# Patient Record
Sex: MALE | Race: BLACK OR AFRICAN AMERICAN | Employment: FULL TIME | URBAN - METROPOLITAN AREA
[De-identification: names, ages, dates, MRNs, and addresses within clinical notes are randomized per-mention and may not be internally consistent; named-entity substitution may affect disease eponyms.]

---

## 2017-09-08 ENCOUNTER — APPOINTMENT (OUTPATIENT)
Dept: GENERAL RADIOLOGY | Facility: HOSPITAL | Age: 37
End: 2017-09-08
Attending: EMERGENCY MEDICINE
Payer: COMMERCIAL

## 2017-09-08 ENCOUNTER — HOSPITAL ENCOUNTER (EMERGENCY)
Facility: HOSPITAL | Age: 37
Discharge: HOME | End: 2017-09-08
Attending: EMERGENCY MEDICINE
Payer: COMMERCIAL

## 2017-09-08 VITALS
BODY MASS INDEX: 30.66 KG/M2 | OXYGEN SATURATION: 99 % | DIASTOLIC BLOOD PRESSURE: 92 MMHG | WEIGHT: 207 LBS | HEART RATE: 85 BPM | HEIGHT: 69 IN | TEMPERATURE: 98 F | SYSTOLIC BLOOD PRESSURE: 135 MMHG | RESPIRATION RATE: 18 BRPM

## 2017-09-08 DIAGNOSIS — S29.019A STRAIN OF THORACIC REGION, INITIAL ENCOUNTER: Primary | ICD-10-CM

## 2017-09-08 LAB
ANION GAP SERPL CALC-SCNC: 9 MMOL/L (ref 0–18)
BUN SERPL-MCNC: 11 MG/DL (ref 8–20)
BUN/CREAT SERPL: 9.3 (ref 10–20)
CALCIUM SERPL-MCNC: 9.5 MG/DL (ref 8.5–10.5)
CHLORIDE SERPL-SCNC: 105 MMOL/L (ref 95–110)
CO2 SERPL-SCNC: 27 MMOL/L (ref 22–32)
CREAT SERPL-MCNC: 1.18 MG/DL (ref 0.5–1.5)
D DIMER PPP FEU-MCNC: 0.27 MCG/ML (ref ?–0.5)
GLUCOSE SERPL-MCNC: 71 MG/DL (ref 70–99)
OSMOLALITY UR CALC.SUM OF ELEC: 290 MOSM/KG (ref 275–295)
POTASSIUM SERPL-SCNC: 4.3 MMOL/L (ref 3.3–5.1)
SODIUM SERPL-SCNC: 141 MMOL/L (ref 136–144)

## 2017-09-08 PROCEDURE — 99283 EMERGENCY DEPT VISIT LOW MDM: CPT

## 2017-09-08 PROCEDURE — 80048 BASIC METABOLIC PNL TOTAL CA: CPT | Performed by: EMERGENCY MEDICINE

## 2017-09-08 PROCEDURE — 36415 COLL VENOUS BLD VENIPUNCTURE: CPT

## 2017-09-08 PROCEDURE — 71010 XR CHEST AP PORTABLE  (CPT=71010): CPT | Performed by: EMERGENCY MEDICINE

## 2017-09-08 PROCEDURE — 85379 FIBRIN DEGRADATION QUANT: CPT | Performed by: EMERGENCY MEDICINE

## 2017-09-08 RX ORDER — NAPROXEN 500 MG/1
500 TABLET ORAL 2 TIMES DAILY PRN
Qty: 20 TABLET | Refills: 0 | Status: SHIPPED | OUTPATIENT
Start: 2017-09-08 | End: 2017-09-15

## 2017-09-08 RX ORDER — ACETAMINOPHEN 500 MG
1000 TABLET ORAL ONCE
Status: COMPLETED | OUTPATIENT
Start: 2017-09-08 | End: 2017-09-08

## 2017-09-08 RX ORDER — IBUPROFEN 600 MG/1
600 TABLET ORAL ONCE
Status: COMPLETED | OUTPATIENT
Start: 2017-09-08 | End: 2017-09-08

## 2017-09-08 RX ORDER — CYCLOBENZAPRINE HCL 10 MG
10 TABLET ORAL 3 TIMES DAILY PRN
Qty: 20 TABLET | Refills: 0 | Status: SHIPPED | OUTPATIENT
Start: 2017-09-08 | End: 2017-09-15

## 2017-09-09 NOTE — ED PROVIDER NOTES
Patient Seen in: [facility] AND [facility] Emergency Department    History   Patient presents with:  Back Pain (musculoskeletal)    Stated Complaint: upper back pain    HPI    Patient complains of mid upper back pain. Bilateral.  No trauma.   Did go on long car r room air    GENERAL: awake alert no distress  HEAD: normocephalic, atraumatic,   EYES: PERRLA, EOMI, conj sclera clear  THROAT: mmm, no lesions  NECK: supple, no meningeal signs  LUNGS: no resp distress, cta bilateral-tender across mid lower thoracic peris

## 2018-04-28 ENCOUNTER — HOSPITAL ENCOUNTER (EMERGENCY)
Facility: HOSPITAL | Age: 38
Discharge: HOME | End: 2018-04-28
Attending: EMERGENCY MEDICINE
Payer: COMMERCIAL

## 2018-04-28 VITALS
BODY MASS INDEX: 30.31 KG/M2 | DIASTOLIC BLOOD PRESSURE: 79 MMHG | SYSTOLIC BLOOD PRESSURE: 119 MMHG | HEIGHT: 68 IN | OXYGEN SATURATION: 98 % | TEMPERATURE: 98 F | HEART RATE: 88 BPM | WEIGHT: 200 LBS | RESPIRATION RATE: 17 BRPM

## 2018-04-28 DIAGNOSIS — S23.9XXA THORACIC BACK SPRAIN, INITIAL ENCOUNTER: Primary | ICD-10-CM

## 2018-04-28 PROCEDURE — 99283 EMERGENCY DEPT VISIT LOW MDM: CPT

## 2018-04-28 RX ORDER — CYCLOBENZAPRINE HCL 10 MG
10 TABLET ORAL 3 TIMES DAILY PRN
Qty: 20 TABLET | Refills: 0 | Status: SHIPPED | OUTPATIENT
Start: 2018-04-28 | End: 2018-05-05

## 2018-04-28 RX ORDER — NAPROXEN 500 MG/1
500 TABLET ORAL 2 TIMES DAILY PRN
Qty: 20 TABLET | Refills: 0 | Status: SHIPPED | OUTPATIENT
Start: 2018-04-28 | End: 2018-05-05

## 2018-04-28 NOTE — ED INITIAL ASSESSMENT (HPI)
c/o back spasms starting this morning, was here in September for the same thing, never followed up with a doctor regarding issues.  Denies incontinence or CP

## 2018-04-28 NOTE — ED PROVIDER NOTES
Patient Seen in: [facility] AND [facility] Emergency Department    History   Patient presents with:  Back Pain (musculoskeletal)    Stated Complaint:     HPI    The patient is a 70-year-old male who presents with left-sided mid back pain upon waking this morning Thoracic back: He exhibits tenderness, pain and spasm. He exhibits normal range of motion, no bony tenderness, no swelling and normal pulse.         Back:    No vertebral tenderness, no calf tenderness or swelling     Differential diagnosis includes mu

## 2018-04-28 NOTE — ED NOTES
Pt presents to ED with a c/o \"stabbing\" pain to right mid back. States he woke up this morning and \"turned a funny way\", pain onset shortly after. States pain radiated to upper back and neck originally, now localized to right mid back.  Denies any  co

## 2018-10-15 ENCOUNTER — HOSPITAL ENCOUNTER (EMERGENCY)
Facility: HOSPITAL | Age: 38
Discharge: HOME | End: 2018-10-15
Attending: EMERGENCY MEDICINE
Payer: COMMERCIAL

## 2018-10-15 VITALS
HEART RATE: 75 BPM | SYSTOLIC BLOOD PRESSURE: 132 MMHG | RESPIRATION RATE: 16 BRPM | BODY MASS INDEX: 29.62 KG/M2 | WEIGHT: 200 LBS | OXYGEN SATURATION: 96 % | DIASTOLIC BLOOD PRESSURE: 82 MMHG | HEIGHT: 69 IN | TEMPERATURE: 98 F

## 2018-10-15 DIAGNOSIS — F32.A DEPRESSION, UNSPECIFIED DEPRESSION TYPE: Primary | ICD-10-CM

## 2018-10-15 PROCEDURE — 99284 EMERGENCY DEPT VISIT MOD MDM: CPT

## 2018-10-15 PROCEDURE — 85025 COMPLETE CBC W/AUTO DIFF WBC: CPT

## 2018-10-15 PROCEDURE — 80307 DRUG TEST PRSMV CHEM ANLYZR: CPT | Performed by: EMERGENCY MEDICINE

## 2018-10-15 PROCEDURE — 80320 DRUG SCREEN QUANTALCOHOLS: CPT

## 2018-10-15 PROCEDURE — 85025 COMPLETE CBC W/AUTO DIFF WBC: CPT | Performed by: EMERGENCY MEDICINE

## 2018-10-15 PROCEDURE — 80307 DRUG TEST PRSMV CHEM ANLYZR: CPT

## 2018-10-15 PROCEDURE — 80048 BASIC METABOLIC PNL TOTAL CA: CPT | Performed by: EMERGENCY MEDICINE

## 2018-10-15 PROCEDURE — 80320 DRUG SCREEN QUANTALCOHOLS: CPT | Performed by: EMERGENCY MEDICINE

## 2018-10-15 PROCEDURE — 36415 COLL VENOUS BLD VENIPUNCTURE: CPT

## 2018-10-15 PROCEDURE — 99285 EMERGENCY DEPT VISIT HI MDM: CPT

## 2018-10-15 PROCEDURE — 80048 BASIC METABOLIC PNL TOTAL CA: CPT

## 2018-10-15 PROCEDURE — 81003 URINALYSIS AUTO W/O SCOPE: CPT | Performed by: EMERGENCY MEDICINE

## 2018-10-15 NOTE — ED NOTES
Dinner tray at bedside. Pt continues to be calm and cooperative, in direct view of this RN. Watching tv, denies needs at this time.

## 2018-10-15 NOTE — ED INITIAL ASSESSMENT (HPI)
Pt presents to ED for psych eval states \"I just want the pain to go away\". States feeling depressed. Denies SI attempt.

## 2018-10-15 NOTE — ED PROVIDER NOTES
Patient Seen in: [facility] Emergency Department    History   Patient presents with:  Eval-P (psychiatric)    Stated Complaint: pts states he needs to see someone about possibly having a \"mental breakdown\"    HPI    45-year-old male without sign SpO2 97%   BMI 29.53 kg/m²         Physical Exam      General Appearance: alert, no distress  Eyes: pupils equal and round no pallor or injection  ENT, Mouth: mucous membranes moist  Respiratory: there are no retractions, lungs are clear to auscultation diagnosis)    Disposition:  There is no disposition on file for this visit. There is no disposition time on file for this visit. Follow-up:  No follow-up provider specified.       Medications Prescribed:  There are no discharge medications for this kolton

## 2018-10-15 NOTE — ED NOTES
Care assumed from triage. Pt presents with c/o depression, reporting inc in life stressors such as job stress and a recent \"rough\" breakup with a girlfriend.  Pt states \"I just can't handle myself\" and reports that he is unstable emotionally, tearful, c

## 2018-10-16 NOTE — BH LEVEL OF CARE ASSESSMENT
Level of Care Assessment Note    General Questions  Why are you here?: \"Last Thursday, I hit a wall. I have been in a limbo state and  I have no desire to do anything. I have been missing work. I get there and then I end  up clocking out.   I am respo days): No  6. Have you ever done anything, started to do anything, or prepared to do anything to end your life? (lifetime): No  Score -  OV: 0- Low Risk   Describe : Gemma denies any sicidal behvior and denies any safety concens.   Both Marco Antonio [employer] worth livivng. Isolating. Lying in bed. Unable to work due to depression being incapacitating in his functiong. Anhedonia.   Crying spelss  Anxiety Symptoms: Generalized  Panic Attacks: denied  Trauma Reaction: (denied)  Bipolar Symptoms: No problems re you/others concerned about any of the following behaviors over the past 30 days?: Denies                                              Functional Impairment  Currently Attending School: No  Employment Status: Employed  Job Issues: Decreased Concentration;Mi Consciousness: Alert  Behavior  Exhibited behavior: Appropriate to situation;Participated; Other (comment)(asking for help)    Assessment Summary  Assessment Summary: Sourav Elkins is a 41year old male.   He presents to the ER with symptoms of worsening depression Psychiatric Diagnoses  Substance Related and Addictive Disorders: Cannabis-Related Disorder - Cannabis Use Disorder  Secondary Cannabis Use Disorder:  Moderate

## 2018-10-16 NOTE — ED NOTES
Belongings returned by [employer]. Pt cleared for d/c home by ED MD and crisis clinician. Discharged home with plan to follow up with PCP and ELLY as indicated. Alert and interactive. Hemodynamically stable.  Agrees with proposed care plan, all questions

## 2018-10-16 NOTE — ED PROVIDER NOTES
Signout taken with dispo pending crisis evaluation, Andra graciously evaluating and deeming appropriate for outpatient PHP with same tentatively scheduled for tomorrow - will DC home with same.

## 2018-10-17 ENCOUNTER — TELEPHONE (OUTPATIENT)
Dept: INTERNAL MEDICINE CLINIC | Facility: CLINIC | Age: 38
End: 2018-10-17

## 2018-10-17 ENCOUNTER — APPOINTMENT (OUTPATIENT)
Dept: LAB | Facility: HOSPITAL | Age: 38
End: 2018-10-17
Attending: INTERNAL MEDICINE
Payer: COMMERCIAL

## 2018-10-17 DIAGNOSIS — F33.2 SEVERE EPISODE OF RECURRENT MAJOR DEPRESSIVE DISORDER, WITHOUT PSYCHOTIC FEATURES (HCC): ICD-10-CM

## 2018-10-17 PROCEDURE — 84443 ASSAY THYROID STIM HORMONE: CPT

## 2018-10-17 PROCEDURE — 36415 COLL VENOUS BLD VENIPUNCTURE: CPT

## 2018-10-17 NOTE — PROGRESS NOTES
Sukumar Irby is a 41year old male. Patient presents with:  Depression      HPI:     HPI  Patient is a 42 yo M with no significant past medical history here with complaints of feeling depressed for last 3 months.   He has no history of diagnosed depression Smokeless tobacco: Never Used    Alcohol use: Yes      Comment: 250 mL of liquor/month, last drink - 3 weeks ago (as of 06/10/15)    Drug use: Yes      Types: Cannabis       REVIEW OF SYSTEMS:   Review of Systems   Constitutional: Positive for malaise/f delayed, not tangential and not slurred. He is withdrawn. He is not agitated, not aggressive, not hyperactive, not actively hallucinating and not combative. Thought content is not paranoid and not delusional. Cognition and memory are not impaired.  He does

## 2018-10-17 NOTE — ASSESSMENT & PLAN NOTE
Chronic major depression. Starting Lexapro 5 mg. Side effects like dizziness, headaches, dry mouth, vague GI discomfort and black box warning of worsening depression and suicidal thoughts  Discussed with the patient. Reassess in 3 weeks.   Increase the d

## 2018-10-17 NOTE — TELEPHONE ENCOUNTER
PATIENT DROPPED OFF FMLA FORMS. HE PAID THE $25 FEE AT TIME OF DROP OFF. PLEASE CALL PATIENT WHEN FORMS ARE COMPLETED. Claus Mccullough  PATIENT [facility] [phone]

## 2018-10-18 NOTE — TELEPHONE ENCOUNTER
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 Logged for processing.  NK

## 2018-10-23 NOTE — TELEPHONE ENCOUNTER
Dr. Ewa Hood    Please sign off on form:  -Highlight the patient and hit \"Chart\" button. -In Chart Review, w/in the Encounter tab - click 1 time on the Telephone call encounter for 10/17/18.  Scroll down the telephone encounter.  -Click \"scan on\" blue Hyp

## 2018-10-24 NOTE — TELEPHONE ENCOUNTER
[facility] and to [address] [place] [address] noted on HIPAA. Mailed copy to pt. Notified pt.

## 2018-11-12 NOTE — PROGRESS NOTES
Shabana Zee is a 41year old male. Patient presents with:  Depression      HPI:     MARLON  VANITA is here for 4-week follow-up of depression after starting Lexapro 5 mg.   As mentioned in my previous notes he was having significant symptoms of depression swelling. Gastrointestinal: Negative for abdominal pain, blood in stool, constipation, diarrhea and heartburn. Genitourinary: Negative for dysuria and frequency. Musculoskeletal: Negative for back pain, joint pain and neck pain.    Skin: Negative for Addressed This Visit        Mental Health    Severe episode of recurrent major depressive disorder, without psychotic features ([facility] [address] - Primary     Symptoms improving on Lexapro 5 mg. Will increase to 10 mg for better results. Tolerating it well.   Noted m

## 2018-11-12 NOTE — ASSESSMENT & PLAN NOTE
Symptoms improving on Lexapro 5 mg. Will increase to 10 mg for better results. Tolerating it well. Noted mild side effects-loss of appetite and dry mouth. Overall tolerating well. Reassess in 3 months. Continue counseling therapy on weekly basis.   It

## 2018-11-14 ENCOUNTER — TELEPHONE (OUTPATIENT)
Dept: ADMINISTRATIVE | Age: 38
End: 2018-11-14

## 2018-11-15 NOTE — TELEPHONE ENCOUNTER
[employer] Provider Statement form for Dr. Chen Grate received in [facility]. Logged for processing.  NK

## 2018-11-19 NOTE — TELEPHONE ENCOUNTER
Dr. Daniela Diaz,    Psychiatric eval form pending in Forms Dept. Do you know when the pt can return back to work or is it pending his therapist? Please advise.     Thank you,  [facility]

## 2018-11-19 NOTE — TELEPHONE ENCOUNTER
Spoke to pt and he is seeing a therapist. Arnulfo Floress to disregard forms. Will fax back to his nurse  to follow up with therapist. Anant Parekh last ov notes. Mailed copies to pt.

## 2019-02-14 ENCOUNTER — LAB ENCOUNTER (OUTPATIENT)
Dept: LAB | Facility: HOSPITAL | Age: 39
End: 2019-02-14
Attending: INTERNAL MEDICINE
Payer: COMMERCIAL

## 2019-02-14 ENCOUNTER — OFFICE VISIT (OUTPATIENT)
Dept: INTERNAL MEDICINE CLINIC | Facility: CLINIC | Age: 39
End: 2019-02-14
Payer: COMMERCIAL

## 2019-02-14 VITALS
SYSTOLIC BLOOD PRESSURE: 135 MMHG | TEMPERATURE: 99 F | HEIGHT: 69.5 IN | WEIGHT: 207.69 LBS | HEART RATE: 77 BPM | BODY MASS INDEX: 30.07 KG/M2 | DIASTOLIC BLOOD PRESSURE: 83 MMHG

## 2019-02-14 DIAGNOSIS — F33.2 SEVERE EPISODE OF RECURRENT MAJOR DEPRESSIVE DISORDER, WITHOUT PSYCHOTIC FEATURES (HCC): ICD-10-CM

## 2019-02-14 DIAGNOSIS — Z11.3 SCREENING FOR STD (SEXUALLY TRANSMITTED DISEASE): Primary | ICD-10-CM

## 2019-02-14 DIAGNOSIS — Z11.3 SCREENING FOR STD (SEXUALLY TRANSMITTED DISEASE): ICD-10-CM

## 2019-02-14 PROCEDURE — 99213 OFFICE O/P EST LOW 20 MIN: CPT | Performed by: INTERNAL MEDICINE

## 2019-02-14 PROCEDURE — 86803 HEPATITIS C AB TEST: CPT

## 2019-02-14 PROCEDURE — 36415 COLL VENOUS BLD VENIPUNCTURE: CPT

## 2019-02-14 PROCEDURE — 87340 HEPATITIS B SURFACE AG IA: CPT

## 2019-02-14 PROCEDURE — 87591 N.GONORRHOEAE DNA AMP PROB: CPT

## 2019-02-14 PROCEDURE — 99212 OFFICE O/P EST SF 10 MIN: CPT | Performed by: INTERNAL MEDICINE

## 2019-02-14 PROCEDURE — 86780 TREPONEMA PALLIDUM: CPT

## 2019-02-14 PROCEDURE — 87491 CHLMYD TRACH DNA AMP PROBE: CPT

## 2019-02-14 PROCEDURE — 87389 HIV-1 AG W/HIV-1&-2 AB AG IA: CPT

## 2019-02-14 NOTE — PROGRESS NOTES
Antonio Friedman is a 41year old male. Patient presents with:  STD      HPI:     HPI  Patient is a 82-year-old male with history of depression here for screening for sexually transmitted disease. With single partner. No specific complaints.   Denies any pen noted.   Psychiatric: He has a normal mood and affect. ASSESSMENT AND PLAN:       Diagnoses and all orders for this visit:    Screening for STD (sexually transmitted disease)  -     HCV ANTIBODY; Future  -     HEPATITIS B SURFACE ANTIGEN;  Future  -

## 2019-02-15 LAB
C TRACH DNA SPEC QL NAA+PROBE: NEGATIVE
HBV SURFACE AG SER-ACNC: 0.28 [IU]/L
HBV SURFACE AG SERPL QL IA: NONREACTIVE
HCV AB SERPL QL IA: NONREACTIVE
N GONORRHOEA DNA SPEC QL NAA+PROBE: NEGATIVE
T PALLIDUM AB SER QL: NEGATIVE

## 2020-02-11 NOTE — ED NOTES
Crisis clinician completed at bedside. Pts friend at bedside, her belongings include car keys and ID. [employer] f/u scheduled with Lori for Monday 2/17/20  ----- Message from Nilesh Ramos sent at 2/11/2020 11:03 AM CST -----  Contact: Lori ( [facility])   .Type:  Patient Returning Call    Who Called: Lori   Who Left Message for Patient: Jhoana   Does the patient know what this is regarding?: yes   Would the patient rather a call back or a response via [facility]?  Callback   Best Call Back Number:[phone]   Additional Information:            [phone]

## 2021-02-11 ENCOUNTER — LAB ENCOUNTER (OUTPATIENT)
Dept: LAB | Facility: HOSPITAL | Age: 41
End: 2021-02-11
Attending: INTERNAL MEDICINE
Payer: MEDICAID

## 2021-02-11 ENCOUNTER — OFFICE VISIT (OUTPATIENT)
Dept: INTERNAL MEDICINE CLINIC | Facility: CLINIC | Age: 41
End: 2021-02-11
Payer: MEDICAID

## 2021-02-11 VITALS
DIASTOLIC BLOOD PRESSURE: 70 MMHG | SYSTOLIC BLOOD PRESSURE: 120 MMHG | HEIGHT: 69.5 IN | WEIGHT: 182 LBS | RESPIRATION RATE: 14 BRPM | BODY MASS INDEX: 26.35 KG/M2 | HEART RATE: 102 BPM | OXYGEN SATURATION: 97 %

## 2021-02-11 DIAGNOSIS — R42 VERTIGO: ICD-10-CM

## 2021-02-11 DIAGNOSIS — Z00.00 ADULT GENERAL MEDICAL EXAM: ICD-10-CM

## 2021-02-11 DIAGNOSIS — F12.10 CANNABIS ABUSE: ICD-10-CM

## 2021-02-11 DIAGNOSIS — K64.9 HEMORRHOIDS, UNSPECIFIED HEMORRHOID TYPE: Primary | ICD-10-CM

## 2021-02-11 LAB
ALBUMIN SERPL-MCNC: 3.8 G/DL (ref 3.4–5)
ALBUMIN/GLOB SERPL: 1.1 {RATIO} (ref 1–2)
ALP LIVER SERPL-CCNC: 59 U/L
ALT SERPL-CCNC: 24 U/L
ANION GAP SERPL CALC-SCNC: 8 MMOL/L (ref 0–18)
AST SERPL-CCNC: 26 U/L (ref 15–37)
BILIRUB SERPL-MCNC: 0.6 MG/DL (ref 0.1–2)
BUN BLD-MCNC: 16 MG/DL (ref 7–18)
BUN/CREAT SERPL: 14.2 (ref 10–20)
CALCIUM BLD-MCNC: 9.6 MG/DL (ref 8.5–10.1)
CHLORIDE SERPL-SCNC: 112 MMOL/L (ref 98–112)
CHOLEST SMN-MCNC: 235 MG/DL (ref ?–200)
CO2 SERPL-SCNC: 22 MMOL/L (ref 21–32)
CREAT BLD-MCNC: 1.13 MG/DL
DEPRECATED RDW RBC AUTO: 38.5 FL (ref 35.1–46.3)
ERYTHROCYTE [DISTWIDTH] IN BLOOD BY AUTOMATED COUNT: 11.8 % (ref 11–15)
EST. AVERAGE GLUCOSE BLD GHB EST-MCNC: 91 MG/DL (ref 68–126)
GLOBULIN PLAS-MCNC: 3.6 G/DL (ref 2.8–4.4)
GLUCOSE BLD-MCNC: 83 MG/DL (ref 70–99)
HBA1C MFR BLD HPLC: 4.8 % (ref ?–5.7)
HCT VFR BLD AUTO: 47 %
HDLC SERPL-MCNC: 59 MG/DL (ref 40–59)
HGB BLD-MCNC: 14.7 G/DL
LDLC SERPL CALC-MCNC: 164 MG/DL (ref ?–100)
M PROTEIN MFR SERPL ELPH: 7.4 G/DL (ref 6.4–8.2)
MCH RBC QN AUTO: 28 PG (ref 26–34)
MCHC RBC AUTO-ENTMCNC: 31.3 G/DL (ref 31–37)
MCV RBC AUTO: 89.5 FL
NONHDLC SERPL-MCNC: 176 MG/DL (ref ?–130)
OSMOLALITY SERPL CALC.SUM OF ELEC: 294 MOSM/KG (ref 275–295)
PATIENT FASTING Y/N/NP: YES
PATIENT FASTING Y/N/NP: YES
PLATELET # BLD AUTO: 267 10(3)UL (ref 150–450)
POTASSIUM SERPL-SCNC: 4.4 MMOL/L (ref 3.5–5.1)
RBC # BLD AUTO: 5.25 X10(6)UL
SODIUM SERPL-SCNC: 142 MMOL/L (ref 136–145)
TRIGL SERPL-MCNC: 60 MG/DL (ref 30–149)
TSI SER-ACNC: 0.92 MIU/ML (ref 0.36–3.74)
VLDLC SERPL CALC-MCNC: 12 MG/DL (ref 0–30)
WBC # BLD AUTO: 6.2 X10(3) UL (ref 4–11)

## 2021-02-11 PROCEDURE — 36415 COLL VENOUS BLD VENIPUNCTURE: CPT

## 2021-02-11 PROCEDURE — 83036 HEMOGLOBIN GLYCOSYLATED A1C: CPT

## 2021-02-11 PROCEDURE — 84443 ASSAY THYROID STIM HORMONE: CPT

## 2021-02-11 PROCEDURE — 87591 N.GONORRHOEAE DNA AMP PROB: CPT | Performed by: INTERNAL MEDICINE

## 2021-02-11 PROCEDURE — 87389 HIV-1 AG W/HIV-1&-2 AB AG IA: CPT

## 2021-02-11 PROCEDURE — 80061 LIPID PANEL: CPT

## 2021-02-11 PROCEDURE — 3008F BODY MASS INDEX DOCD: CPT | Performed by: INTERNAL MEDICINE

## 2021-02-11 PROCEDURE — 80053 COMPREHEN METABOLIC PANEL: CPT

## 2021-02-11 PROCEDURE — 3074F SYST BP LT 130 MM HG: CPT | Performed by: INTERNAL MEDICINE

## 2021-02-11 PROCEDURE — 85027 COMPLETE CBC AUTOMATED: CPT

## 2021-02-11 PROCEDURE — 87491 CHLMYD TRACH DNA AMP PROBE: CPT | Performed by: INTERNAL MEDICINE

## 2021-02-11 PROCEDURE — 3078F DIAST BP <80 MM HG: CPT | Performed by: INTERNAL MEDICINE

## 2021-02-11 PROCEDURE — 99214 OFFICE O/P EST MOD 30 MIN: CPT | Performed by: INTERNAL MEDICINE

## 2021-02-11 RX ORDER — MECLIZINE HYDROCHLORIDE 25 MG/1
25 TABLET ORAL 3 TIMES DAILY PRN
Qty: 45 TABLET | Refills: 0 | Status: SHIPPED | OUTPATIENT
Start: 2021-02-11 | End: 2021-02-26

## 2021-02-11 NOTE — PROGRESS NOTES
Evangelina Srinivasan is a 47year old male. Patient presents with:  Establish Care: NP Physical     HPI:   57-year-old pleasant gentleman here for evaluation of hemorrhoids, vertigo. He reports that he started to have hemorrhoids almost 3 years back.   Whenever he Negative for chest pain. Gastrointestinal: Negative for vomiting, abdominal pain and abdominal distention. Genitourinary: Negative for hematuria. Skin: Negative for wound. Psychiatric/Behavioral: Negative for behavioral problems.      [employer] fr for HIV testing and STD screening. If everything is good, I will see him back in couple of months for physical.      The patient indicates understanding of these issues and agrees to the plan. No follow-ups on file.

## 2021-02-12 LAB
C TRACH DNA SPEC QL NAA+PROBE: NEGATIVE
N GONORRHOEA DNA SPEC QL NAA+PROBE: NEGATIVE

## (undated) NOTE — ED AVS SNAPSHOT
Nathan Perry   MRN: [id number]    Department:  [facility] Emergency Department   Date of Visit:  10/15/2018           Disclosure     Insurance plans vary and the physician(s) referred by the ER may not be covered by your plan.  Please contact yo CARE PHYSICIAN AT ONCE OR RETURN IMMEDIATELY TO THE EMERGENCY DEPARTMENT. If you have been prescribed any medication(s), please fill your prescription right away and begin taking the medication(s) as directed.   If you believe that any of the medications

## (undated) NOTE — LETTER
September 8, 2017    Patient: Penny Castro   Date of Visit: 9/8/2017       To Whom It May Concern: Penny Castro was seen and treated in our emergency department on 9/8/2017. He is excused from work for the next 2-3 days.     If you have any questions

## (undated) NOTE — LETTER
Date & Time: 10/15/2018, 7:38 PM  Patient: Chelsea Cloud  Encounter Provider(s):    Mariaa Lopez MD       To Whom It May Concern: Chelsea Cloud was seen and treated in our department on 10/15/2018.  He should not return to work until re-evaluated/[place]

## (undated) NOTE — LETTER
Date & Time: 4/28/2018, 8:04 AM  Patient: Hannah Styles  Encounter Provider(s):    Cristian Stapleton MD       To Whom It May Concern: Hannah Styles was seen and treated in our department on 4/28/2018. He return to work on 4/30/18.   If you have any ques

## (undated) NOTE — ED AVS SNAPSHOT
Penny Castro   MRN: [id number]    Department:  [facility] Emergency Department   Date of Visit:  9/8/2017           Disclosure     Insurance plans vary and the physician(s) referred by the ER may not be covered by your plan.  Please contact your CARE PHYSICIAN AT ONCE OR RETURN IMMEDIATELY TO THE EMERGENCY DEPARTMENT. If you have been prescribed any medication(s), please fill your prescription right away and begin taking the medication(s) as directed.   If you believe that any of the medications